# Patient Record
Sex: FEMALE | Race: WHITE | Employment: FULL TIME | URBAN - METROPOLITAN AREA
[De-identification: names, ages, dates, MRNs, and addresses within clinical notes are randomized per-mention and may not be internally consistent; named-entity substitution may affect disease eponyms.]

---

## 2017-08-22 ENCOUNTER — OFFICE VISIT (OUTPATIENT)
Dept: OBGYN CLINIC | Age: 52
End: 2017-08-22
Payer: COMMERCIAL

## 2017-08-22 ENCOUNTER — HOSPITAL ENCOUNTER (OUTPATIENT)
Age: 52
Setting detail: SPECIMEN
Discharge: HOME | End: 2017-08-22
Payer: COMMERCIAL

## 2017-08-22 VITALS
HEART RATE: 87 BPM | WEIGHT: 231 LBS | SYSTOLIC BLOOD PRESSURE: 129 MMHG | HEIGHT: 70 IN | BODY MASS INDEX: 33.07 KG/M2 | DIASTOLIC BLOOD PRESSURE: 86 MMHG | RESPIRATION RATE: 18 BRPM

## 2017-08-22 DIAGNOSIS — N92.6 IRREGULAR BLEEDING: ICD-10-CM

## 2017-08-22 DIAGNOSIS — Z12.4 CERVICAL CANCER SCREENING: ICD-10-CM

## 2017-08-22 DIAGNOSIS — Z01.419 WELL WOMAN EXAM: Primary | ICD-10-CM

## 2017-08-22 DIAGNOSIS — Z12.39 BREAST CANCER SCREENING: ICD-10-CM

## 2017-08-22 LAB
ESTRADIOL LEVEL: 112 PG/ML
FOLLICLE STIMULATING HORMONE: 10 U/L (ref 1.7–21.5)
LH: 6.6 U/L (ref 1–95.6)

## 2017-08-22 PROCEDURE — 99396 PREV VISIT EST AGE 40-64: CPT | Performed by: SPECIALIST

## 2017-08-22 RX ORDER — CLOTRIMAZOLE AND BETAMETHASONE DIPROPIONATE 10; .64 MG/G; MG/G
CREAM TOPICAL
Qty: 1 TUBE | Refills: 2 | Status: SHIPPED | OUTPATIENT
Start: 2017-08-22 | End: 2020-03-06

## 2017-08-22 ASSESSMENT — ENCOUNTER SYMPTOMS
EYE PAIN: 0
VOMITING: 0
DIARRHEA: 0
COUGH: 0
ABDOMINAL PAIN: 0
CONSTIPATION: 0
ABDOMINAL DISTENTION: 0
APNEA: 0
NAUSEA: 0

## 2017-08-28 LAB — CYTOLOGY REPORT: NORMAL

## 2017-08-29 ENCOUNTER — TELEPHONE (OUTPATIENT)
Dept: OBGYN CLINIC | Age: 52
End: 2017-08-29

## 2018-12-07 ENCOUNTER — TELEPHONE (OUTPATIENT)
Dept: OBGYN CLINIC | Age: 53
End: 2018-12-07

## 2018-12-21 ENCOUNTER — OFFICE VISIT (OUTPATIENT)
Dept: OBGYN CLINIC | Age: 53
End: 2018-12-21
Payer: COMMERCIAL

## 2018-12-21 ENCOUNTER — HOSPITAL ENCOUNTER (OUTPATIENT)
Age: 53
Setting detail: SPECIMEN
Discharge: HOME | End: 2018-12-21
Payer: COMMERCIAL

## 2018-12-21 VITALS
SYSTOLIC BLOOD PRESSURE: 135 MMHG | HEIGHT: 71 IN | RESPIRATION RATE: 18 BRPM | BODY MASS INDEX: 33.04 KG/M2 | DIASTOLIC BLOOD PRESSURE: 81 MMHG | HEART RATE: 77 BPM | WEIGHT: 236 LBS | TEMPERATURE: 99.1 F

## 2018-12-21 DIAGNOSIS — L72.0 INCLUSION CYST: ICD-10-CM

## 2018-12-21 DIAGNOSIS — Z11.51 SPECIAL SCREENING EXAMINATION FOR HUMAN PAPILLOMAVIRUS (HPV): ICD-10-CM

## 2018-12-21 DIAGNOSIS — Z12.39 SCREENING FOR BREAST CANCER: ICD-10-CM

## 2018-12-21 DIAGNOSIS — N76.0 ACUTE VAGINITIS: ICD-10-CM

## 2018-12-21 DIAGNOSIS — Z01.411 ENCOUNTER FOR GYNECOLOGICAL EXAMINATION WITH ABNORMAL FINDING: Primary | ICD-10-CM

## 2018-12-21 PROCEDURE — 99396 PREV VISIT EST AGE 40-64: CPT | Performed by: SPECIALIST

## 2018-12-21 RX ORDER — CLINDAMYCIN HYDROCHLORIDE 300 MG/1
300 CAPSULE ORAL 3 TIMES DAILY
Qty: 30 CAPSULE | Refills: 0 | Status: SHIPPED | OUTPATIENT
Start: 2018-12-21 | End: 2018-12-21 | Stop reason: CLARIF

## 2018-12-21 RX ORDER — FLUCONAZOLE 100 MG/1
100 TABLET ORAL DAILY
Qty: 7 TABLET | Refills: 0 | Status: SHIPPED | OUTPATIENT
Start: 2018-12-21 | End: 2018-12-28

## 2018-12-21 ASSESSMENT — ENCOUNTER SYMPTOMS
CONSTIPATION: 0
NAUSEA: 0
EYE PAIN: 0
COUGH: 0
VOMITING: 0
ABDOMINAL DISTENTION: 0
APNEA: 0
DIARRHEA: 0
ABDOMINAL PAIN: 0

## 2018-12-21 NOTE — PROGRESS NOTES
Subjective:      Patient ID: Danica Shelby is a 48 y.o. female. Chief Complaint   Patient presents with   Jessica Macias [facility] Doctor    Annual Exam     The patient is here for her annual examination today. /81 (Site: Right Upper Arm, Position: Sitting, Cuff Size: Large Adult)   Pulse 77   Temp 99.1 °F (37.3 °C) (Oral)   Resp 18   Ht 5' 11\" (1.803 m)   Wt 236 lb (107 kg)   LMP 2018 (Exact Date)   Breastfeeding? No   BMI 32.92 kg/m²   Patient's last menstrual period was 2018 (exact date).     Past Medical History:   Diagnosis Date    Allergic     ASCUS of cervix with negative high risk HPV 2016     Current Outpatient Prescriptions Ordered in [facility]   Medication Sig Dispense Refill    Cetirizine HCl 10 MG CAPS cetirizine 10 mg tablet   Take 1 tablet every day by oral route.  fluconazole (DIFLUCAN) 100 MG tablet Take 1 tablet by mouth daily for 7 days 7 tablet 0    Secnidazole (SOLOSEC) 2 g PACK Take 1 packet by mouth once for 1 dose Take one packet with yogurt, pudding, or applesauce. DO NOT CHEW. 1 each 0    norgestrel-ethinyl estradiol (LOW-OGESTREL) 0.3-30 MG-MCG per tablet Take 1 tablet by mouth daily 3 packet 0    clotrimazole-betamethasone (LOTRISONE) 1-0.05 % cream Apply externally to affected area twice daily for 7 days. 1 Tube 2     No current Epic-ordered facility-administered medications on file.       Problem List Items Addressed This Visit     None      Visit Diagnoses     Encounter for gynecological examination with abnormal finding    -  Primary    Relevant Orders    PAP SMEAR    Acute vaginitis        Special screening examination for human papillomavirus (HPV)        Relevant Orders    PAP SMEAR    Screening for breast cancer        Relevant Orders    ANN MARIE DIGITAL SCREEN W CAD BILATERAL    Inclusion cyst            Allergies   Allergen Reactions    Penicillins     Pollen Extract      Orders Placed This Encounter   Procedures    [facility] DIGITAL inguinal adenopathy present. Neurological: She is alert and oriented to person, place, and time. Skin: Skin is warm and dry. Patient with non-infected inclusion cyst of right inner, upper thigh, which was removed without difficulty by squeezing. Psychiatric: She has a normal mood and affect. Judgment normal.   Nursing note and vitals reviewed. Assessment:      Patient with vaginitis, otherwise normal annual exam.  Pap smear was done. Will treat with Solosec and Diflucan. Patient was advised to have mammogram done. Patient advised that if inclusion cyst returns, squeeze it to remove it. She was also made aware that at times, these become infected and pain. She should call the office if this occurs. Plan:      Orders Placed This Encounter   Procedures    ANN MARIE DIGITAL SCREEN W CAD BILATERAL    PAP SMEAR     Orders Placed This Encounter   Medications    fluconazole (DIFLUCAN) 100 MG tablet     Sig: Take 1 tablet by mouth daily for 7 days     Dispense:  7 tablet     Refill:  0    DISCONTD: clindamycin (CLEOCIN) 300 MG capsule     Sig: Take 1 capsule by mouth 3 times daily for 10 days     Dispense:  30 capsule     Refill:  0    Secnidazole (SOLOSEC) 2 g PACK     Sig: Take 1 packet by mouth once for 1 dose Take one packet with yogurt, pudding, or applesauce. DO NOT CHEW. Dispense:  1 each     Refill:  0     Appointment in 1 year    IEarline, am scribing for, and in the presence of Dr. Carlyle Lundborg. Electronically signed by: Earline Cheek 12/21/18 11:33 AM     I agree to the above documentation placed by my scribe Earline Cheek. I reviewed the scribe's note and agree with the documented findings and plan of care. Any areas of disagreement are noted on the chart. I have personally evaluated this patient. Additional findings are as noted.  I agree with the chief complaint, past medical history, past surgical history, allergies, medications, social and family history as documented unless otherwise noted below.      Electronically signed by Aftab Navarro MD on 12/24/2018 at 8:50 PM

## 2018-12-28 LAB
HPV SAMPLE: NORMAL
HPV SOURCE: NORMAL
HPV, GENOTYPE 16: NOT DETECTED
HPV, GENOTYPE 18: NOT DETECTED
HPV, HIGH RISK OTHER: NOT DETECTED
HPV, INTERPRETATION: NORMAL

## 2019-01-10 LAB — CYTOLOGY REPORT: NORMAL

## 2019-02-16 ENCOUNTER — HOSPITAL ENCOUNTER (OUTPATIENT)
Dept: MAMMOGRAPHY | Age: 54
Discharge: HOME | End: 2019-02-18
Payer: COMMERCIAL

## 2019-02-16 DIAGNOSIS — Z12.39 SCREENING FOR BREAST CANCER: ICD-10-CM

## 2019-02-16 PROCEDURE — 77063 BREAST TOMOSYNTHESIS BI: CPT

## 2019-12-03 RX ORDER — NORGESTREL AND ETHINYL ESTRADIOL 0.3-0.03MG
KIT ORAL
Qty: 84 TABLET | Refills: 0 | Status: SHIPPED | OUTPATIENT
Start: 2019-12-03 | End: 2020-02-26 | Stop reason: SDUPTHER

## 2020-02-24 RX ORDER — NORGESTREL AND ETHINYL ESTRADIOL 0.3-0.03MG
KIT ORAL
Qty: 84 TABLET | Refills: 0 | OUTPATIENT
Start: 2020-02-24

## 2020-03-06 ENCOUNTER — OFFICE VISIT (OUTPATIENT)
Dept: OBGYN CLINIC | Age: 55
End: 2020-03-06
Payer: COMMERCIAL

## 2020-03-06 ENCOUNTER — HOSPITAL ENCOUNTER (OUTPATIENT)
Age: 55
Setting detail: SPECIMEN
Discharge: HOME | End: 2020-03-06
Payer: COMMERCIAL

## 2020-03-06 VITALS
SYSTOLIC BLOOD PRESSURE: 140 MMHG | HEIGHT: 71 IN | HEART RATE: 80 BPM | DIASTOLIC BLOOD PRESSURE: 90 MMHG | WEIGHT: 253 LBS | RESPIRATION RATE: 16 BRPM | BODY MASS INDEX: 35.42 KG/M2

## 2020-03-06 PROBLEM — R03.0 ELEVATED BLOOD PRESSURE READING: Status: ACTIVE | Noted: 2020-03-06

## 2020-03-06 PROBLEM — Z01.419 WELL WOMAN EXAM: Status: ACTIVE | Noted: 2020-03-06

## 2020-03-06 PROBLEM — Z12.31 OTHER SCREENING MAMMOGRAM: Status: ACTIVE | Noted: 2020-03-06

## 2020-03-06 PROCEDURE — 99396 PREV VISIT EST AGE 40-64: CPT | Performed by: SPECIALIST

## 2020-03-06 RX ORDER — SIMVASTATIN 20 MG
TABLET ORAL
COMMUNITY
Start: 2020-02-16 | End: 2021-04-15

## 2020-03-06 RX ORDER — ALBUTEROL SULFATE 90 UG/1
AEROSOL, METERED RESPIRATORY (INHALATION)
COMMUNITY

## 2020-03-06 RX ORDER — MELOXICAM 15 MG/1
TABLET ORAL
COMMUNITY
Start: 2020-01-31 | End: 2022-05-20 | Stop reason: HOSPADM

## 2020-03-06 ASSESSMENT — ENCOUNTER SYMPTOMS
NAUSEA: 0
EYE PAIN: 0
CONSTIPATION: 0
ABDOMINAL DISTENTION: 0
DIARRHEA: 0
ABDOMINAL PAIN: 0
COUGH: 0
VOMITING: 0
APNEA: 0

## 2020-03-06 NOTE — PROGRESS NOTES
Subjective:      Patient ID: Sonia Lenz is a 48 y.o. female. Chief Complaint   Patient presents with    Gynecologic Exam     patient is here today for her annual pap and breast exam.     BP (!) 140/90 (Site: Right Upper Arm, Position: Sitting, Cuff Size: Large Adult)   Pulse 80   Resp 16   Ht 5' 11\" (1.803 m)   Wt 253 lb (114.8 kg)   LMP 2020 (Exact Date)   BMI 35.29 kg/m²   Patient's last menstrual period was 2020 (exact date).     Past Medical History:   Diagnosis Date    Allergic     ASCUS of cervix with negative high risk HPV 2016     Current Outpatient Medications Ordered in Epic   Medication Sig Dispense Refill    simvastatin (ZOCOR) 20 MG tablet TK 1 T PO QD      meloxicam (MOBIC) 15 MG tablet TK 1 T PO QD      albuterol sulfate HFA (PROAIR HFA) 108 (90 Base) MCG/ACT inhaler ProAir HFA 90 mcg/actuation aerosol inhaler      norgestrel-ethinyl estradiol (LOW-OGESTREL) 0.3-30 MG-MCG per tablet TAKE 1 TABLET BY MOUTH DAILY 30 tablet 0    Cetirizine HCl 10 MG CAPS cetirizine 10 mg tablet   Take 1 tablet every day by oral route. No current [facility]-ordered facility-administered medications on file. Problem List Items Addressed This Visit     Well woman exam - Primary    Relevant Orders    PAP SMEAR    Other screening mammogram    Relevant Orders    PAP SMEAR    ANN MARIE DIGITAL SCREEN W CAD BILATERAL    Elevated blood pressure reading    Relevant Orders    PAP SMEAR        Allergies   Allergen Reactions    Grass Extracts  [Gramineae Pollens]     Penicillins     Pollen Extract      Orders Placed This Encounter   Procedures    ANN MARIE DIGITAL SCREEN W CAD BILATERAL     Standing Status:   Future     Standing Expiration Date:   2020     Order Specific Question:   Reason for exam:     Answer:   yearly breast cancer screening    PAP SMEAR     Patient History:    Patient's last menstrual period was 2020 (exact date).   OBGYN Status: Having periods  Past year.    Myron Moran am scribing for, and in the presence of Dr. Meggan Suggs. Electronically signed by: Bridgtete Madrid 3/6/20 9:26 AM       I agree to the above documentation placed by my scribe Bridgette Madrid. I reviewed the scribe's note and agree with the documented findings and plan of care. Any areas of disagreement are noted on the chart. I have personally evaluated this patient. Additional findings are as noted. I agree with the chief complaint, past medical history, past surgical history, allergies, medications, social and family history as documented unless otherwise noted below.      Electronically signed by Meggan Suggs MD on 3/8/2020 at 3:56 AM

## 2020-03-10 LAB
HPV SAMPLE: NORMAL
HPV, GENOTYPE 16: NOT DETECTED
HPV, GENOTYPE 18: NOT DETECTED
HPV, HIGH RISK OTHER: NOT DETECTED
HPV, INTERPRETATION: NORMAL
SPECIMEN DESCRIPTION: NORMAL

## 2020-03-16 LAB — CYTOLOGY REPORT: NORMAL

## 2020-04-05 PROBLEM — Z01.419 WELL WOMAN EXAM: Status: RESOLVED | Noted: 2020-03-06 | Resolved: 2020-04-05

## 2020-05-18 RX ORDER — NORGESTREL AND ETHINYL ESTRADIOL 0.3-0.03MG
KIT ORAL
Qty: 28 TABLET | Refills: 1 | Status: SHIPPED | OUTPATIENT
Start: 2020-05-18 | End: 2020-06-16 | Stop reason: SDUPTHER

## 2020-06-13 ENCOUNTER — HOSPITAL ENCOUNTER (OUTPATIENT)
Dept: WOMENS IMAGING | Age: 55
Discharge: HOME | End: 2020-06-15
Payer: COMMERCIAL

## 2020-06-13 PROCEDURE — 77063 BREAST TOMOSYNTHESIS BI: CPT

## 2020-06-15 ENCOUNTER — TELEPHONE (OUTPATIENT)
Dept: OBGYN CLINIC | Age: 55
End: 2020-06-15

## 2020-06-16 RX ORDER — NORGESTREL AND ETHINYL ESTRADIOL 0.3-0.03MG
KIT ORAL
Qty: 84 TABLET | Refills: 1 | Status: SHIPPED | OUTPATIENT
Start: 2020-06-16 | End: 2020-07-15

## 2020-07-15 RX ORDER — NORGESTREL AND ETHINYL ESTRADIOL 0.3-0.03MG
KIT ORAL
Qty: 28 TABLET | Refills: 1 | Status: SHIPPED | OUTPATIENT
Start: 2020-07-15 | End: 2020-11-23

## 2020-11-25 ENCOUNTER — NURSE ONLY (OUTPATIENT)
Dept: OBGYN CLINIC | Age: 55
End: 2020-11-25

## 2020-11-25 VITALS — SYSTOLIC BLOOD PRESSURE: 122 MMHG | DIASTOLIC BLOOD PRESSURE: 68 MMHG

## 2021-02-23 ENCOUNTER — TELEPHONE (OUTPATIENT)
Dept: OBGYN CLINIC | Age: 56
End: 2021-02-23

## 2021-03-11 ENCOUNTER — TELEPHONE (OUTPATIENT)
Dept: OBGYN CLINIC | Age: 56
End: 2021-03-11

## 2021-03-11 NOTE — TELEPHONE ENCOUNTER
Called lm for pt to let her know that she does not need appt tomorrow to see JADA Mark for MRI orders these orders will be placed and be scheduled at her convenience.

## 2021-03-12 DIAGNOSIS — Z12.39 ENCOUNTER FOR SCREENING FOR MALIGNANT NEOPLASM OF BREAST, UNSPECIFIED SCREENING MODALITY: Primary | ICD-10-CM

## 2021-04-15 ENCOUNTER — HOSPITAL ENCOUNTER (OUTPATIENT)
Age: 56
Setting detail: SPECIMEN
Discharge: HOME | End: 2021-04-15
Payer: COMMERCIAL

## 2021-04-15 ENCOUNTER — OFFICE VISIT (OUTPATIENT)
Dept: OBGYN CLINIC | Age: 56
End: 2021-04-15
Payer: COMMERCIAL

## 2021-04-15 VITALS
BODY MASS INDEX: 37.8 KG/M2 | HEART RATE: 84 BPM | SYSTOLIC BLOOD PRESSURE: 134 MMHG | HEIGHT: 70 IN | DIASTOLIC BLOOD PRESSURE: 90 MMHG | WEIGHT: 264 LBS

## 2021-04-15 DIAGNOSIS — Z80.3 FAMILY HISTORY OF BREAST CANCER IN MOTHER: ICD-10-CM

## 2021-04-15 DIAGNOSIS — Z12.31 ENCOUNTER FOR SCREENING MAMMOGRAM FOR BREAST CANCER: ICD-10-CM

## 2021-04-15 DIAGNOSIS — Z01.419 PAP SMEAR, AS PART OF ROUTINE GYNECOLOGICAL EXAMINATION: Primary | ICD-10-CM

## 2021-04-15 DIAGNOSIS — Z11.51 SCREENING FOR HUMAN PAPILLOMAVIRUS: ICD-10-CM

## 2021-04-15 PROCEDURE — 99396 PREV VISIT EST AGE 40-64: CPT | Performed by: SPECIALIST

## 2021-04-15 RX ORDER — SIMVASTATIN 40 MG
40 TABLET ORAL NIGHTLY
COMMUNITY

## 2021-04-15 ASSESSMENT — ENCOUNTER SYMPTOMS
VOMITING: 0
DIARRHEA: 0
APNEA: 0
ABDOMINAL DISTENTION: 0
COUGH: 0
CONSTIPATION: 0
EYE PAIN: 0
NAUSEA: 0
ABDOMINAL PAIN: 0

## 2021-04-15 NOTE — PROGRESS NOTES
Subjective:      Patient ID: Balwinder Jara is a 50 y.o. female. Chief Complaint   Patient presents with    Annual Exam     BP (!) 134/90 (Site: Right Lower Arm, Position: Sitting, Cuff Size: Medium Adult)   Pulse 84   Ht 5' 10\" (1.778 m)   Wt 264 lb (119.7 kg)   LMP 2021 (Approximate)   Breastfeeding No   BMI 37.88 kg/m²   Patient's last menstrual period was 2021 (approximate).     Past Medical History:   Diagnosis Date    Allergic     ASCUS of cervix with negative high risk HPV 2016    Fibromyalgia      Current Outpatient Medications Ordered in Epic   Medication Sig Dispense Refill    simvastatin (ZOCOR) 40 MG tablet Take 40 mg by mouth nightly      LOW-OGESTREL 0.3-30 MG-MCG per tablet TAKE 1 TABLET DAILY PER PACKAGE INSERT 84 tablet 0    meloxicam (MOBIC) 15 MG tablet TK 1 T PO QD      albuterol sulfate HFA (PROAIR HFA) 108 (90 Base) MCG/ACT inhaler ProAir HFA 90 mcg/actuation aerosol inhaler      Cetirizine HCl 10 MG CAPS cetirizine 10 mg tablet   Take 1 tablet every day by oral route. No current [facility]-ordered facility-administered medications on file. Problem List Items Addressed This Visit     Pap smear, as part of routine gynecological examination - Primary    Relevant Orders    PAP SMEAR    Encounter for screening mammogram for breast cancer    Relevant Orders    ANN MARIE DIGITAL SCREEN W OR WO CAD BILATERAL    Family history of breast cancer in mother    Relevant Orders    [facility]        Allergies   Allergen Reactions    Grass Extracts  [Gramineae Pollens]     Penicillins     Pollen Extract      Orders Placed This Encounter   Procedures    ANN MARIE DIGITAL SCREEN W OR WO CAD BILATERAL     Standing Status:   Future     Standing Expiration Date:   2022     Order Specific Question:   Reason for exam:     Answer:   screen breast cancer    PAP SMEAR     Patient History:    No LMP recorded.   OBGYN Status: Having periods  Past Surgical History:  No date: APPENDECTOMY  No date: KNEE ARTHROSCOPY; Left  Medications/Contraceptives Affecting Cytology     Combination Contraceptives - Oral Disp Start End     LOW-OGESTREL 0.3-30 MG-MCG per tablet    84 tablet 2/15/2021     Sig: TAKE 1 TABLET DAILY PER PACKAGE INSERT       Social History    Tobacco Use      Smoking status: Never Smoker      Smokeless tobacco: Never Used       Standing Status:   Future     Standing Expiration Date:   4/15/2022     Order Specific Question:   Collection Type     Answer: Thin Prep     Order Specific Question:   Prior Abnormal Pap Test     Answer:   No     Order Specific Question:   Screening or Diagnostic     Answer:   Screening     Order Specific Question:   HPV Requested? Answer:   Yes     Order Specific Question:   High Risk Patient     Answer:   N/A    [facility]     Referral Priority:   Routine     Referral Type:   Eval and Treat     Referral Reason:   Specialty Services Required     Requested Specialty:   Oncology     Number of Visits Requested:   1        Patient is here today for her annual exam.  Patient needs a referral to a high risk breast clinic because her mother and grandmother had breast breast cancer. Patient is having periods and wants to know when she can expect menopause. She does wake up in the middle of the night fully drenched. She is still taking birth control pills. Review of Systems   Constitutional: Negative for activity change, appetite change and fever. HENT: Negative for ear discharge and ear pain. Eyes: Negative for pain and visual disturbance. Respiratory: Negative for apnea and cough. Cardiovascular: Negative for chest pain, palpitations and leg swelling. Gastrointestinal: Negative for abdominal distention, abdominal pain, constipation, diarrhea, nausea and vomiting. Endocrine: Negative. Genitourinary: Negative for difficulty urinating, dysuria, menstrual problem and pelvic pain. Musculoskeletal: Negative for neck pain and neck stiffness. Skin: Negative. Neurological: Negative for light-headedness and numbness. Hematological: Negative. Does not bruise/bleed easily. Objective:   Physical Exam  Vitals signs and nursing note reviewed. Exam conducted with a chaperone present. Constitutional:       Appearance: She is well-developed. HENT:      Head: Normocephalic and atraumatic. Neck:      Thyroid: No thyroid mass or thyromegaly. Cardiovascular:      Rate and Rhythm: Normal rate and regular rhythm. Pulmonary:      Effort: Pulmonary effort is normal.      Breath sounds: Normal breath sounds. Chest:      Breasts:         Right: Normal. No inverted nipple, mass, nipple discharge, skin change or tenderness. Left: Normal. No inverted nipple, mass, nipple discharge, skin change or tenderness. Abdominal:      General: Bowel sounds are normal. There is no distension. Palpations: Abdomen is soft. There is no mass. Tenderness: There is no abdominal tenderness. There is no guarding or rebound. Hernia: There is no hernia in the left inguinal area. Genitourinary:     General: Normal vulva. Exam position: Supine. Labia:         Right: No rash or lesion. Left: No rash or lesion. Vagina: Normal. No signs of injury. No vaginal discharge or tenderness. Cervix: No cervical motion tenderness or discharge. Uterus: Normal. Not enlarged, not fixed and not tender. Adnexa: Right adnexa normal and left adnexa normal.        Right: No mass or tenderness. Left: No mass or tenderness. Rectum: Normal. No mass or anal fissure. Normal anal tone. Musculoskeletal: Normal range of motion. General: No tenderness. Skin:     General: Skin is warm and dry. Neurological:      Mental Status: She is alert and oriented to person, place, and time.    Psychiatric:         Judgment: Judgment normal.         Assessment:

## 2021-04-20 LAB
CYTOLOGY REPORT: NORMAL
HPV SAMPLE: NORMAL
HPV, GENOTYPE 16: NOT DETECTED
HPV, GENOTYPE 18: NOT DETECTED
HPV, HIGH RISK OTHER: NOT DETECTED
HPV, INTERPRETATION: NORMAL
SPECIMEN DESCRIPTION: NORMAL

## 2021-05-10 RX ORDER — NORGESTREL AND ETHINYL ESTRADIOL 0.3-0.03MG
KIT ORAL
Qty: 84 TABLET | Refills: 3 | Status: SHIPPED | OUTPATIENT
Start: 2021-05-10 | End: 2022-04-11

## 2021-05-10 NOTE — TELEPHONE ENCOUNTER
Last visit: 04-15-21  Last Med refill: 04-15-21  Does patient have enough medication for 72 hours: NO    Next Visit Date:  No future appointments.     Health Maintenance   Topic Date Due    Hepatitis C screen  Never done    Lipid screen  Never done    HIV screen  Never done    COVID-19 Vaccine (1) Never done    DTaP/Tdap/Td vaccine (1 - Tdap) Never done    Diabetes screen  Never done    Shingles Vaccine (1 of 2) Never done    Colon cancer screen colonoscopy  Never done    Breast cancer screen  06/13/2021    Flu vaccine (Season Ended) 09/01/2021    Cervical cancer screen  04/15/2026    Hepatitis A vaccine  Aged Out    Hepatitis B vaccine  Aged Out    Hib vaccine  Aged Out    Meningococcal (ACWY) vaccine  Aged Out    Pneumococcal 0-64 years Vaccine  Aged Out       No results found for: LABA1C          ( goal A1C is < 7)   No results found for: LABMICR  No results found for: LDLCHOLESTEROL, LDLCALC    (goal LDL is <100)   No results found for: AST, ALT, BUN  BP Readings from Last 3 Encounters:   04/15/21 (!) 134/90   11/25/20 122/68   03/06/20 (!) 140/90          (goal 120/80)    All Future Testing planned in [facility]  Lab Frequency Next Occurrence   MRI BREAST BILATERAL WO CONTRAST Once 04/29/2021   ANN MARIE DIGITAL SCREEN W OR WO CAD BILATERAL Once 07/01/2021               Patient Active Problem List:     ASCUS of cervix with negative high risk HPV     Pap smear, as part of routine gynecological examination     Other screening mammogram     Elevated blood pressure reading     Encounter for screening mammogram for breast cancer     Family history of breast cancer in mother

## 2021-05-15 PROBLEM — Z01.419 PAP SMEAR, AS PART OF ROUTINE GYNECOLOGICAL EXAMINATION: Status: RESOLVED | Noted: 2020-03-06 | Resolved: 2021-05-15

## 2021-05-15 PROBLEM — Z12.31 ENCOUNTER FOR SCREENING MAMMOGRAM FOR BREAST CANCER: Status: RESOLVED | Noted: 2021-04-15 | Resolved: 2021-05-15

## 2021-09-14 ENCOUNTER — TELEPHONE (OUTPATIENT)
Dept: ONCOLOGY | Age: 56
End: 2021-09-14

## 2021-09-14 ENCOUNTER — INITIAL CONSULT (OUTPATIENT)
Dept: ONCOLOGY | Age: 56
End: 2021-09-14
Payer: COMMERCIAL

## 2021-09-14 VITALS
TEMPERATURE: 96.8 F | DIASTOLIC BLOOD PRESSURE: 92 MMHG | WEIGHT: 250 LBS | HEIGHT: 71 IN | HEART RATE: 78 BPM | SYSTOLIC BLOOD PRESSURE: 144 MMHG | BODY MASS INDEX: 35 KG/M2

## 2021-09-14 DIAGNOSIS — Z80.3 FAMILY HISTORY OF BREAST CANCER IN MOTHER: Primary | ICD-10-CM

## 2021-09-14 DIAGNOSIS — Z91.89 AT HIGH RISK FOR BREAST CANCER: ICD-10-CM

## 2021-09-14 PROCEDURE — 99202 OFFICE O/P NEW SF 15 MIN: CPT | Performed by: INTERNAL MEDICINE

## 2021-09-14 PROCEDURE — 99204 OFFICE O/P NEW MOD 45 MIN: CPT | Performed by: INTERNAL MEDICINE

## 2021-09-14 PROCEDURE — 96040 PR GENETIC COUNSELING, EACH 30 MIN: CPT | Performed by: GENETIC COUNSELOR, MS

## 2021-09-14 RX ORDER — PREGABALIN 75 MG/1
CAPSULE ORAL
COMMUNITY

## 2021-09-14 NOTE — PROGRESS NOTES
3 [facility]   Hereditary Cancer Risk Assessment     Name: Ash Ramos   YOB: 1965   Date of Consultation: 9/14/21     Ms. Andria Ahumada was seen at the [place] [address] for genetic counseling on 9/14/21. She is also seen by medical oncologist Dr. Eileen Canavan. Ms. Andria Ahumada was referred by Apurva Hill MD to discuss her elevated lifetime risk for breast cancer which was calculated at her most recent mammogram.     PERSONAL HISTORY   Ms. Andria Ahumada is a 50 y.o.  female with no personal history of cancer. She reports menarche at age 17, first child at age 23, and is shelton menopausal. Ms. Andria Ahumada has never had a hysterectomy and both ovaries are intact. Ms. Andria Ahumada reports annual mammograms. She has never had a breast MRI. She did have a cyst removed from her right breast many years ago. At her mammogram on 6/13/21, a lifetime risk for breast cancer was calculated. According to the [employer] risk model, Ms. Weber's lifetime risk for breast cancer is approximately 21.4%. Additional history was collected during today's consultation, which did adjust Ms. Weber's lifetime risk for breast cancer to 24.6%. FAMILY HISTORY  Ms. Andria Ahumada has one son (32y) and one daughter (32y). She has one full sister (46y) and one full brother (52y), no cancer. Ms. Brent Crum mother passed away at age 68 and had a history of breast cancer diagnosed at age 43. She was later diagnosed with metastatic cancer, it is unclear if it was metastatic breast cancer or a new primary lung cancer. Ms. Brent Crum maternal grandmother also had breast cancer at age 61. Ms. Brent Crum father passed away at age 76 and had a history of bladder cancer. Her father was adopted; thus, there is no information known regarding her extended paternal family.       Ms. Andria Ahumada reports [place] ancestry cancers. We discussed the possibility of finding a mutation in genes with limited information to guide medical management, as well we as the possibility of identifying variants of uncertain significance (VUS). We discussed the risks, benefits, and limitations of genetic testing. Possible test results were discussed as well as potential screening and prevention strategies. Specifically, we discussed increased breast cancer surveillance by mammogram and breast MRI as well as the option of prophylactic mastectomy. We discussed the recommendation for prophylactic oophorectomy for results which suggest an increased risk for ovarian cancer. Lastly, we discussed that the results of Ms. Weber's genetic testing may be beneficial in defining her risk for cancer as well as for her family members. SUMMARY & PLAN  1) Ms. Gopi Beverly meets the NCCN criteria for genetic testing based on her maternal family history of early onset breast cancer. 2) Genetic testing via a multi-gene panel was recommended and offered to Ms. Weber. She declined to proceed with testing today; she would like additional time to consider her options. An insurance verification will be requested to determine Ms. Weber's potential out of pocket cost of testing in the meantime. 3) Ms. Lorene Agustin lifetime risk for breast cancer is approximately 24.6%. She may consider an annual breast MRI staggered 6 months apart from her annual mammogram according to the NCCN. She is due for her annual mammogram and plans to discuss MRI screening with Dr. Tracey Clarke when she follows up with him in April 2022. Ms. Gopi Beverly met with Dr. Celio Silva today to review breast cancer screening recommendations. A total of 40 minutes were spent face to face with Ms. Gopi Beverly and 50% of the time was spent educating and counseling.      The [address] [place], [place] [address] would be glad to offer our assistance should you have any questions or concerns about this information. Please feel free to contact us at [phone]. Sohail Sotomayor.  Willy Mckeon MS, [facility]   Licensed Genetic Counselor

## 2021-09-15 NOTE — PROGRESS NOTES
BRIEF CASE HISTORY: the patient  is a very pleasant 50 y.o.  female who is referred to us for consultation for the high risk  breast cancer clinic. She is evaluated by myself and the genetic counselor. She does not have any personal history of breast cancer but she has significant family history. She underwent screening mammogram and during the screen, she took the [facility]. The questionnaire showed that her lifetime risk of breast cancer is 21.4  which falls into the high risk category. She is referred to us for counseling, discussion of risk reduction modalities as well as genetic evaluation. GYN history   Menarche: 17  Menopause age:     perimenopausal        PAST MEDICAL HISTORY:  Past Medical History:   Diagnosis Date    Allergic     ASCUS of cervix with negative high risk HPV 9/8/2016    Fibromyalgia 2021            PAST SURGICAL HISTORY: has a past surgical history that includes Appendectomy and Knee arthroscopy (Left). CURRENT MEDICATIONS:  has a current medication list which includes the following prescription(s): pregabalin, low-ogestrel, simvastatin, meloxicam, albuterol sulfate hfa, and cetirizine hcl. ALLERGIES:    Allergies   Allergen Reactions    Grass Extracts  [Gramineae Pollens]     Penicillins     Pollen Extract           FAMILY HISTORY: detailed family history was obtained, and reviewed, it is detailed in the genetic counselor note. SOCIAL HISTORY:  reports that she has never smoked. She has never used smokeless tobacco. She reports that she does not drink alcohol and does not use drugs. REVIEW OF SYSTEMS:   General: No fever or night sweats. Weight is stable. ENT: No double or blurred vision, no tinnitus or hearing problem, no dysphagia or sore throat   Respiratory: No chest pain, no shortness of breath, no cough or hemoptysis. Cardiovascular: Denies chest pain, PND or orthopnea.  No L E swelling or palpitations. Gastrointestinal: No nausea or vomiting, abdominal pain, diarrhea or constipation. Genitourinary: Denies dysuria, hematuria, frequency, urgency or incontinence. Neurological: Denies headaches, decreased LOC, no sensory or motor focal deficits. Musculoskeletal: No arthralgia no back pain or joint swelling. PHYSICAL EXAM: Shows a well appearing 50 y.o.  female who is not in pain or distress. Vital Signs:@  HEENT: Normocephalic and atraumatic. Pupils are equal, round, reactive to light and accommodation. Extraocular muscles are intact. Neck: Showed no JVD, no carotid bruit . Lungs: Clear to auscultation bilaterally. Heart: Regular without any murmur. Abdomen: Soft, nontender. No hepatosplenomegaly. Extremities: Lower extremities show no edema, clubbing, or cyanosis. Breasts: Examination not done today. (declined)  Neuro exam: intact cranial nerves bilaterally no motor or sensory deficit, gait is normal. Lymphatic: no adenopathy appreciated in the supraclavicular, axillary, cervical or inguinal area     REVIEW OF LABORATORY DATA:      REVIEW OF RADIOLOGICAL RESULTS:   Recent imaging studies were reviewed           IMPRESSION:   1. Elevated lifetime risk of breast cancer based on the [address], life time risk in 20.1    PLAN:   I had a very long discussion with the patient, explaining the Jalyn Dart model and the risk factor that led to hair falling into the high risk category. Lifetime risk of developing breast cancer in average [facility] woman ranges between 10% and 12%. So her lifetime risk is almost double the normal.   Various governing bodies in the oncology world have determined that lifetime risk of breast cancer above 20% constitutes a group of women who are eligible for enhanced surveillance and should be counseled about genetic breast cancer.   Also guidelines determined that this group of women are eligible for breast cancer reduction modalities such as the use of tamoxifen based on the NSABP study of tamoxifen and raloxifene and breast cancer (STAR trial) her tamoxifen was able to decrease the discomfort risk cancer by 50%  Side effects of tamoxifen were explained in detail including hot flashes, possibility of elevated liver enzymes, the rare possibilities of deep venous thrombosis and the even more rare possibility of vaginal bleeding, endometrial hyperplasia or even endometrial carcinoma. After thorough discussion and careful explanation of the above, the patient chose enhanced surveillance, she declined genetic testing and tamoxifen. She will think about genetic testing after discussing with her family. The patient needs alternating mammogram and MRI every 6 months. She will follow with her Gyn.   RTC as needed    Genetic counselor note:   Please refer to note from [place] for additional details. Thank you for asking us to see the patient, please feel free to contact us with any question or concern. Burak Trejo MD  Hematologist/Medical Oncologist      On this date 9/15/21  I have spent 60 minutes reviewing previous notes, test results and face to face with the patient discussing the diagnosis and importance of compliance with the treatment plan. Greater than 50% of that time was spent face-to-face with the patient in counseling and coordinating her care. This note is created with the assistance of a speech recognition program.  While intending to generate a document that actually reflects the content of the visit, the document can still have some errors including those of syntax and sound a like substitutions which may escape proof reading. It such instances, actual meaning can be extrapolated by contextual diversion.

## 2021-10-29 ENCOUNTER — HOSPITAL ENCOUNTER (OUTPATIENT)
Dept: WOMENS IMAGING | Age: 56
Discharge: HOME | End: 2021-10-31
Payer: COMMERCIAL

## 2021-10-29 DIAGNOSIS — Z12.31 ENCOUNTER FOR SCREENING MAMMOGRAM FOR BREAST CANCER: ICD-10-CM

## 2021-10-29 PROCEDURE — 77063 BREAST TOMOSYNTHESIS BI: CPT

## 2022-04-11 RX ORDER — NORGESTREL AND ETHINYL ESTRADIOL 0.3-0.03MG
KIT ORAL
Qty: 84 TABLET | Refills: 0 | Status: SHIPPED | OUTPATIENT
Start: 2022-04-11 | End: 2022-05-20 | Stop reason: HOSPADM

## 2022-05-20 ENCOUNTER — APPOINTMENT (OUTPATIENT)
Dept: CT IMAGING | Age: 57
End: 2022-05-20
Payer: COMMERCIAL

## 2022-05-20 ENCOUNTER — HOSPITAL ENCOUNTER (EMERGENCY)
Age: 57
Discharge: HOME | End: 2022-05-20
Attending: EMERGENCY MEDICINE
Payer: COMMERCIAL

## 2022-05-20 VITALS
TEMPERATURE: 98.4 F | HEIGHT: 70 IN | OXYGEN SATURATION: 97 % | HEART RATE: 86 BPM | SYSTOLIC BLOOD PRESSURE: 148 MMHG | DIASTOLIC BLOOD PRESSURE: 90 MMHG | BODY MASS INDEX: 37.94 KG/M2 | WEIGHT: 265 LBS | RESPIRATION RATE: 18 BRPM

## 2022-05-20 DIAGNOSIS — I26.94 MULTIPLE SUBSEGMENTAL PULMONARY EMBOLI WITHOUT ACUTE COR PULMONALE (HCC): Primary | ICD-10-CM

## 2022-05-20 LAB
ABSOLUTE EOS #: 0.2 K/UL (ref 0–0.4)
ABSOLUTE LYMPH #: 2.2 K/UL (ref 1–4.8)
ABSOLUTE MONO #: 0.6 K/UL (ref 0.1–1.2)
ALBUMIN SERPL-MCNC: 4.4 G/DL (ref 3.5–5.2)
ALBUMIN/GLOBULIN RATIO: 1.2 (ref 1–2.5)
ALP BLD-CCNC: 101 U/L (ref 35–104)
ALT SERPL-CCNC: 28 U/L (ref 5–33)
ANION GAP SERPL CALCULATED.3IONS-SCNC: 11 MMOL/L (ref 9–17)
AST SERPL-CCNC: 27 U/L
BASOPHILS # BLD: 1 % (ref 0–2)
BASOPHILS ABSOLUTE: 0.1 K/UL (ref 0–0.2)
BILIRUB SERPL-MCNC: 0.4 MG/DL (ref 0.3–1.2)
BUN BLDV-MCNC: 20 MG/DL (ref 6–20)
CALCIUM SERPL-MCNC: 11.2 MG/DL (ref 8.6–10.4)
CHLORIDE BLD-SCNC: 102 MMOL/L (ref 98–107)
CO2: 25 MMOL/L (ref 20–31)
CREAT SERPL-MCNC: 0.62 MG/DL (ref 0.5–0.9)
D-DIMER QUANTITATIVE: 3.45 MG/L FEU
EOSINOPHILS RELATIVE PERCENT: 3 % (ref 1–4)
GFR AFRICAN AMERICAN: >60 ML/MIN
GFR NON-AFRICAN AMERICAN: >60 ML/MIN
GFR SERPL CREATININE-BSD FRML MDRD: ABNORMAL ML/MIN/{1.73_M2}
GLUCOSE BLD-MCNC: 106 MG/DL (ref 70–99)
HCT VFR BLD CALC: 46.8 % (ref 36–46)
HEMOGLOBIN: 15.5 G/DL (ref 12–16)
LIPASE: 45 U/L (ref 13–60)
LYMPHOCYTES # BLD: 24 % (ref 24–44)
MCH RBC QN AUTO: 30.6 PG (ref 26–34)
MCHC RBC AUTO-ENTMCNC: 33.1 G/DL (ref 31–37)
MCV RBC AUTO: 92.5 FL (ref 80–100)
MONOCYTES # BLD: 6 % (ref 2–11)
PARTIAL THROMBOPLASTIN TIME: 23.4 SEC (ref 21.3–31.3)
PDW BLD-RTO: 14.6 % (ref 12.5–15.4)
PLATELET # BLD: 343 K/UL (ref 140–450)
PMV BLD AUTO: 8.2 FL (ref 6–12)
POTASSIUM SERPL-SCNC: 3.7 MMOL/L (ref 3.7–5.3)
RBC # BLD: 5.06 M/UL (ref 4–5.2)
SEG NEUTROPHILS: 66 % (ref 36–66)
SEGMENTED NEUTROPHILS ABSOLUTE COUNT: 6.2 K/UL (ref 1.8–7.7)
SODIUM BLD-SCNC: 138 MMOL/L (ref 135–144)
TOTAL PROTEIN: 8 G/DL (ref 6.4–8.3)
TROPONIN, HIGH SENSITIVITY: <6 NG/L (ref 0–14)
WBC # BLD: 9.2 K/UL (ref 3.5–11)

## 2022-05-20 PROCEDURE — 80053 COMPREHEN METABOLIC PANEL: CPT

## 2022-05-20 PROCEDURE — 71275 CT ANGIOGRAPHY CHEST: CPT

## 2022-05-20 PROCEDURE — 6370000000 HC RX 637 (ALT 250 FOR IP): Performed by: HOSPITALIST

## 2022-05-20 PROCEDURE — 6360000004 HC RX CONTRAST MEDICATION: Performed by: PHYSICIAN ASSISTANT

## 2022-05-20 PROCEDURE — 83690 ASSAY OF LIPASE: CPT

## 2022-05-20 PROCEDURE — 36415 COLL VENOUS BLD VENIPUNCTURE: CPT

## 2022-05-20 PROCEDURE — 93005 ELECTROCARDIOGRAM TRACING: CPT | Performed by: PHYSICIAN ASSISTANT

## 2022-05-20 PROCEDURE — 85025 COMPLETE CBC W/AUTO DIFF WBC: CPT

## 2022-05-20 PROCEDURE — 84484 ASSAY OF TROPONIN QUANT: CPT

## 2022-05-20 PROCEDURE — 99285 EMERGENCY DEPT VISIT HI MDM: CPT

## 2022-05-20 PROCEDURE — 96360 HYDRATION IV INFUSION INIT: CPT

## 2022-05-20 PROCEDURE — 2580000003 HC RX 258: Performed by: PHYSICIAN ASSISTANT

## 2022-05-20 PROCEDURE — 85379 FIBRIN DEGRADATION QUANT: CPT

## 2022-05-20 PROCEDURE — 85730 THROMBOPLASTIN TIME PARTIAL: CPT

## 2022-05-20 RX ORDER — SODIUM CHLORIDE 0.9 % (FLUSH) 0.9 %
10 SYRINGE (ML) INJECTION PRN
Status: DISCONTINUED | OUTPATIENT
Start: 2022-05-20 | End: 2022-05-20 | Stop reason: HOSPADM

## 2022-05-20 RX ORDER — TIZANIDINE 4 MG/1
TABLET ORAL
COMMUNITY
Start: 2022-04-04

## 2022-05-20 RX ORDER — HEPARIN SODIUM 10000 [USP'U]/100ML
5-30 INJECTION, SOLUTION INTRAVENOUS CONTINUOUS
Status: DISCONTINUED | OUTPATIENT
Start: 2022-05-20 | End: 2022-05-20

## 2022-05-20 RX ORDER — HEPARIN SODIUM 1000 [USP'U]/ML
80 INJECTION, SOLUTION INTRAVENOUS; SUBCUTANEOUS PRN
Status: DISCONTINUED | OUTPATIENT
Start: 2022-05-20 | End: 2022-05-20

## 2022-05-20 RX ORDER — HEPARIN SODIUM 1000 [USP'U]/ML
40 INJECTION, SOLUTION INTRAVENOUS; SUBCUTANEOUS PRN
Status: DISCONTINUED | OUTPATIENT
Start: 2022-05-20 | End: 2022-05-20

## 2022-05-20 RX ORDER — BACLOFEN 20 MG
TABLET ORAL
COMMUNITY
Start: 2022-04-04

## 2022-05-20 RX ORDER — 0.9 % SODIUM CHLORIDE 0.9 %
1000 INTRAVENOUS SOLUTION INTRAVENOUS ONCE
Status: COMPLETED | OUTPATIENT
Start: 2022-05-20 | End: 2022-05-20

## 2022-05-20 RX ORDER — 0.9 % SODIUM CHLORIDE 0.9 %
80 INTRAVENOUS SOLUTION INTRAVENOUS ONCE
Status: DISCONTINUED | OUTPATIENT
Start: 2022-05-20 | End: 2022-05-20 | Stop reason: HOSPADM

## 2022-05-20 RX ORDER — HEPARIN SODIUM 1000 [USP'U]/ML
80 INJECTION, SOLUTION INTRAVENOUS; SUBCUTANEOUS ONCE
Status: DISCONTINUED | OUTPATIENT
Start: 2022-05-20 | End: 2022-05-20

## 2022-05-20 RX ADMIN — APIXABAN 10 MG: 5 TABLET, FILM COATED ORAL at 17:41

## 2022-05-20 RX ADMIN — SODIUM CHLORIDE 1000 ML: 9 INJECTION, SOLUTION INTRAVENOUS at 13:43

## 2022-05-20 RX ADMIN — Medication 80 ML: at 14:14

## 2022-05-20 RX ADMIN — SODIUM CHLORIDE, PRESERVATIVE FREE 10 ML: 5 INJECTION INTRAVENOUS at 14:14

## 2022-05-20 RX ADMIN — IOPAMIDOL 100 ML: 755 INJECTION, SOLUTION INTRAVENOUS at 14:13

## 2022-05-20 ASSESSMENT — PAIN DESCRIPTION - PAIN TYPE: TYPE: ACUTE PAIN

## 2022-05-20 ASSESSMENT — PAIN DESCRIPTION - DESCRIPTORS: DESCRIPTORS: ACHING

## 2022-05-20 ASSESSMENT — PAIN DESCRIPTION - ORIENTATION: ORIENTATION: POSTERIOR

## 2022-05-20 ASSESSMENT — PAIN DESCRIPTION - LOCATION: LOCATION: HEAD

## 2022-05-20 ASSESSMENT — PAIN - FUNCTIONAL ASSESSMENT: PAIN_FUNCTIONAL_ASSESSMENT: 0-10

## 2022-05-20 ASSESSMENT — PAIN SCALES - GENERAL: PAINLEVEL_OUTOF10: 5

## 2022-05-20 NOTE — ED NOTES
AVS reviewed with the patient. Discussed follow-up care, taking medications as prescribed, and when to call 911. VSS. All questions answered. IV Removed per order. Prescriptions sent to patient's pharmacy. Ambulatory out of the department with a steady, unassisted gait.         Cirilo Schmidt RN  05/20/22 0427

## 2022-05-20 NOTE — ED PROVIDER NOTES
[id number] [facility] ED  [address] [address] [facility] [address]  Phone: [phone]  Fax: [phone]      Attending Physician [address]       Chief Complaint   Patient presents with    Chest Pain       DIAGNOSTIC RESULTS     LABS:  Labs Reviewed   CBC WITH AUTO DIFFERENTIAL - Abnormal; Notable for the following components:       Result Value    Hematocrit 46.8 (*)     All other components within normal limits   COMPREHENSIVE METABOLIC PANEL - Abnormal; Notable for the following components:    Glucose 106 (*)     Calcium 11.2 (*)     All other components within normal limits   LIPASE   TROPONIN   D-DIMER, QUANTITATIVE   URINALYSIS WITH REFLEX TO CULTURE       All other labs were within normal range or not returned as of this dictation. RADIOLOGY:  CT CHEST PULMONARY EMBOLISM W CONTRAST    (Results Pending)         EMERGENCY DEPARTMENT COURSE:   Vitals:    Vitals:    05/20/22 1216   BP: (!) 197/120   Pulse: 97   Resp: 18   TempSrc: Oral   SpO2: 99%   Weight: 120.2 kg (265 lb)   Height: 5' 10\" (1.778 m)     -------------------------  BP: (!) 197/120,  , Pulse: 97, Resp: 18             PERTINENT ATTENDING PHYSICIAN COMMENTS:    I performed a history and physical examination of the patient and discussed management with the mid level provider. I reviewed the mid level provider's note and agree with the documented findings and plan of care. Any areas of disagreement are noted on the chart. I was personally present for the key portions of any procedures. I have documented in the chart those procedures where I was not present during the key portions. I have reviewed the emergency nurses triage note. I agree with the chief complaint, past medical history, past surgical history, allergies, medications, social and family history as documented unless otherwise noted below.  Documentation of the HPI, Physical Exam and Medical Decision Making performed by mid level providers is based on my personal performance of the HPI, PE and MDM. For Physician Assistant/ Nurse Practitioner cases/documentation I have personally evaluated this patient and have completed at least one if not all key elements of the E/M (history, physical exam, and MDM). Additional findings are as noted. 16-year-old female here with left sided and retrosternal chest pain that radiates to her back. She also had back pain that then radiated to her chest. No personal history of heart disease, but there is family history. No family history of TAD. No history of connective tissue disease. Patient is quite hypertensive in the ED. Breath sounds are clear and equal. Cardiac exam with a normal rate and regular rhythm. Radial pulses are palpable and symmetrical. No calf tenderness or leg swelling.      EKG Interpretation    Interpreted by emergency department physician    Rhythm: normal sinus   Rate: normal  Axis: left  Ectopy: none  Conduction: normal  ST Segments: normal  T Waves: normal  Q Waves: none    Clinical Impression: non-specific EKG and left ventricular hypertrophy    Eliz Arshad MD          (Please note that portions of this note were completed with a voice recognition program.  Efforts were made to edit the dictations but occasionally words are mis-transcribed.)    Eliz Arshad MD  Attending Emergency Medicine Physician       Eliz Arshad MD  05/20/22 [facility]       Eliz Arshad MD  05/20/22 9133

## 2022-05-20 NOTE — PROGRESS NOTES
Called to evaluate the patient in the emergency room. This is a very pleasant 28-year-old female who presents to the hospital with chest pain. She has been found to have pulmonary embolism. There is no evidence of heart strain noted. With further history taking this appears to be a provoked DVT secondary to birth control pill use. She tells me that she did have a DVT in her calf years ago. This was not treated with anticoagulation and spontaneously resolved. The patient has been on birth control for nearly 30 years at this point. We had a long discussion regarding risk factors for DVT/PE. At this point in time I do recommend discontinuing birth control pills. The patient is not hypoxic and her chest pain has resolved. At this point in time with a long discussion regarding treatment options for her DVT/PE. Patient as well as  would prefer to be treated as an outpatient. I am going to place her on Eliquis. Recommendations are to follow-up with primary care physician on Monday to obtain lower extremity Dopplers. Although she does not have any significant swelling or erythema it may be reasonable to check lower extremities to rule out extensive DVT. That said on physical examination she does not have any evidence of extensive DVT. She does not have any calf tenderness, significant swelling, nor erythema. This point in time both patient as well as  are appreciative all the information regarding DVT/PE. Provided scripts for Eliquis and she can be discharged home with further work-up/follow-up as an outpatient.     Electronically signed by Yadi Posey DO on 5/20/2022 at 4:49 PM

## 2022-05-20 NOTE — ED TRIAGE NOTES
Patient arrives with c/o CP that began a few days ago. States today the pain began when sitting watching tv when she began to feel CP across the chest that lasted approx 5 minutes.

## 2022-05-20 NOTE — ED PROVIDER NOTES
[id number] [facility] ED  [address] [facility] [address]  Phone: [phone]  Fax: [address]      Pt Name: Onofre Herrera  MRN: [id number]  [place] 1965  Date of evaluation: 5/20/2022  Provider: Jesus Bravo PA-C    CHIEF COMPLAINT       Chief Complaint   Patient presents with    Chest Pain           HISTORY OF PRESENT ILLNESS  (Location/Symptom, Timing/Onset, Context/Setting, Quality, Duration, Modifying Factors, Severity.)   Onofre Herrera is a 49 y.o. female who presents to the emergency department complaining of chest symptoms. Context/Setting:   Patient here with  for evaluation of intermittent chest pain symptoms over the last 2 days. First episode was 2 days ago while she was seated, she states this is on the right side of the chest and a little bit of her upper back. This did resolve after about 5 minutes. This was not pleuritic in nature and was not febrile in nature. Patient states while seated today she had a similar 5-minute episode that was of the right/left anterior aspect of her chest which also resolved. This too is not pleuritic in nature. She denies any thoracic musculoskeletal history, she does have some neck pain chronically. She denies any recent overexertion. Upper neck pain/lower headache w/o vision changes or radicular pain. There is no worsening of symptoms with movement of the torso or upper extremities. Patient does report that her father had some coronary artery disease. She is having no new cough or shortness of breath. She denies any GI/ symptoms. No new swelling of the lower extremities or calf pain. Patient does have a history of DVT around 2020, this was spontaneous. She denies any other thrombotic history. Patient has a history of fibromyalgia and some other chronic pain areas.   She denies any use of estrogen/hormones or any associated recent travel or medical procedures. Timing/Onset:   As above  Quality:   Sharp   Pressure   Duration:   As above  Modifying Factors:   none  Severity:   Mild       Nursing Notes were reviewed. REVIEW OF SYSTEMS    (2-9 systems for level 4, 10 or more for level 5)     Constitutional: Denies recent fever, chills. Eyes: No visual changes. Neck: No neck pain. Respiratory: per HPI  Cardiac:  per HPI  GI:  Denies abdominal pain/nausea/vomiting/diarrhea. Musculoskeletal: Denies focal weakness. Neurologic: per HPI  Skin:  No rash. Except as noted above the remainder of the review of systems was reviewed and negative. PAST MEDICAL HISTORY   History reviewed. Diagnosis Date    Allergic     Arthritis     Oestoarthritis of the lower back, recieving steroid injections    ASCUS of cervix with negative high risk HPV 9/8/2016    Fibromyalgia 2021         SURGICAL HISTORY           Procedure Laterality Date    APPENDECTOMY      KNEE ARTHROSCOPY Left     US BREAST FINE NEEDLE ASPIRATION         CURRENT MEDICATIONS       Current Discharge Medication List      CONTINUE these medications which have NOT CHANGED    Details   Magnesium Oxide 500 MG TABS TAKE 1 TABLET BY MOUTH TWICE DAILY AS DIRECTED      tiZANidine (ZANAFLEX) 4 MG tablet TAKE 1 TABLET BY MOUTH THREE TIMES DAILY AS NEEDED      pregabalin (LYRICA) 75 MG capsule pregabalin 75 mg capsule   TAKE ONE CAPSULE BY MOUTH TWICE DAILY      simvastatin (ZOCOR) 40 MG tablet Take 40 mg by mouth nightly      albuterol sulfate HFA (PROAIR HFA) 108 (90 Base) MCG/ACT inhaler ProAir HFA 90 mcg/actuation aerosol inhaler      Cetirizine HCl 10 MG CAPS cetirizine 10 mg tablet   Take 1 tablet every day by oral route. ALLERGIES     Patient has no known allergies.     FAMILY HISTORY           Problem Relation Age of Onset    Heart Disease Father     Cancer Mother     Diabetes Mother     Breast Cancer Mother     Breast Cancer Maternal Grandmother      Family Status Relation Name Status    Father      Mother  Alive   Maryanna Runner  (Not Specified)        SOCIAL HISTORY      reports that she has never smoked. She has never used smokeless tobacco. She reports that she does not drink alcohol and does not use drugs. Lives with others. PHYSICAL EXAM    (up to 7 for level 4, 8 or more for level 5)     ED Triage Vitals [22 1216]   BP Temp Temp Source Pulse Resp SpO2 Height Weight   (!) 197/120 -- Oral 97 18 99 % 5' 10\" (1.778 m) 265 lb (120.2 kg)       Constitutional:  Well developed   Eyes:  Pupils equal/round  HENT:  Atraumatic, external ears normal, nose normal, oropharynx moist. Neck- supple   Respiratory:  Clear to auscultation bilaterally with good air exchange, no W/R/R. No anterior or posterior chestwall tenderness  Cardiovascular:  RRR with normal S1 and S2. Gastrointestinal/Abdomen:  Soft, NT. BS wnl. Musculoskeletal:  No edema, no tenderness, no deformities. Back:  No CVA tenderness. Normal to inspection. Integument:  No rash. Neurologic:  Alert, appropriate mentation/interaction, no focal deficits noted     DIAGNOSTIC RESULTS     EKG: All EKG's are interpreted by the Emergency Department Physician who either signs or Co-signs this chart in the absence of a cardiologist.    Not indicated, or per attending note      RADIOLOGY:   Non-plain film images such as CT, Ultrasound and MRI are read by the radiologist. Ferdie Sauce radiographic images are visualized and preliminarily interpreted by the emergency physician with the below findings:      Interpretation per the Radiologist below, if available at the time of this note:    CTA CHEST W WO CONTRAST   Final Result   Bilateral pulmonary emboli, the most proximal being segmental in the right   lower lobe with subsegmental emboli in the right upper and both lower lobes. Mild cardiomegaly without any prior imaging available for comparison.    Correlate clinically to exclude any evidence of associated right heart strain. No acute pulmonary findings. Multiloculated water attenuation likely hepatic cysts in the right lobe   measuring up to 3.2 cm. Colonic diverticulosis. Critical results were called by Dr. Zandra Rizzo to Dr. Rafa Callejas on   5/20/2022 at 2:58 PM EST. LABS:  Labs Reviewed   CBC WITH AUTO DIFFERENTIAL - Abnormal; Notable for the following components:       Result Value    Hematocrit 46.8 (*)     All other components within normal limits   COMPREHENSIVE METABOLIC PANEL - Abnormal; Notable for the following components:    Glucose 106 (*)     Calcium 11.2 (*)     All other components within normal limits   LIPASE   TROPONIN   D-DIMER, QUANTITATIVE   APTT   URINALYSIS WITH REFLEX TO CULTURE   APTT   APTT       All other labs were within normal range or not returned as of this dictation.     EMERGENCY DEPARTMENT COURSE and DIFFERENTIAL DIAGNOSIS/MDM:   Vitals:    Vitals:    05/20/22 1216 05/20/22 1319 05/20/22 1529   BP: (!) 197/120 (!) 165/100 (!) 162/101   Pulse: 97 76 87   Resp: 18     Temp: 98.4 °F (36.9 °C)     TempSrc: Oral     SpO2: 99% 97% 98%   Weight: 120.2 kg (265 lb)     Height: 5' 10\" (1.778 m)       Orders Placed This Encounter   Medications    0.9 % sodium chloride bolus    0.9 % sodium chloride bolus    iopamidol (ISOVUE-370) 76 % injection 100 mL    sodium chloride flush 0.9 % injection 10 mL    DISCONTD: heparin (porcine) injection 9,620 Units    DISCONTD: heparin (porcine) injection 9,620 Units    DISCONTD: heparin (porcine) injection 4,810 Units    DISCONTD: heparin 25,000 units in dextrose 5% 250 mL (premix) infusion    FOLLOWED BY Linked Order Group     apixaban (ELIQUIS) tablet 10 mg      Order Specific Question:   Indication of Use      Answer:   Treatment-DVT/PE     apixaban (ELIQUIS) tablet 5 mg      Order Specific Question:   Indication of Use      Answer:   Treatment-DVT/PE    apixaban (ELIQUIS) 5 MG TABS tablet     Sig: Take 2 tabs oral twice daily for 1 week then take 1 tab oral twice daily thereafter     Dispense:  74 tablet     Refill:  0    apixaban (ELIQUIS) 5 MG TABS tablet     Sig: Take 1 tablet by mouth 2 times daily     Dispense:  60 tablet     Refill:  5       0892  Patient here with complaints of episodic nonexertional chest pain, most recent was 2 days ago that was not pleuritic in nature nor febrile and then with a recurrence that was across both sides of the anterior aspect of her chest today. Lasted for approximately 5 minutes, with exception of some initial elevated blood pressure all vital signs are stable patient is well-appearing and comfortable. Lungs clear to auscultation, abdomen is benign. No new edema of the lower extremities or any tenderness. Completing cardiac work-up with dimer. Patient has no    1642  Hospitalist evaluated patient down in dept and appropriate for outpt f/u and oral anticoagulant. Eliquis 1st dose provided here. PCP f/u on Monday. Hospitalist discharged patient and educated to return to ED for worsening symptoms, new headache/neuro symptoms or respiratory distress. He provided all rxs upon discharge. This patient was seen by the attending physician and they agreed with the assessment and plan. I have reviewed the disposition diagnosis with the patient and or their family/guardian. I have answered their questions and given discharge instructions. They voiced understanding of these instructions and did not have any further questions or complaints. The patient has been instructed to follow up with their primary care physician and to return immediately to an ED if the symptoms change or worsen in any way. Patient verbalized understanding. CONSULTS:  None    PROCEDURES:  None    FINAL IMPRESSION      1.  Multiple subsegmental pulmonary emboli without acute cor pulmonale [facility])          DISPOSITION/PLAN   DISPOSITION Decision To Discharge 05/20/2022 04:39:49 PM      PATIENT REFERRED TO:  Judith Curtis  [address] [place] [address]  [phone]    Schedule an appointment as soon as possible for a visit in 3 days  for re-evaluation of your symptoms    [facility] ED  [address] [facility]. [address] [place] [address]  [phone]  Go to   for worsening of symptoms      DISCHARGE MEDICATIONS:  Current Discharge Medication List      START taking these medications    Details   !! apixaban (ELIQUIS) 5 MG TABS tablet Take 2 tabs oral twice daily for 1 week then take 1 tab oral twice daily thereafter  Qty: 74 tablet, Refills: 0      !! apixaban (ELIQUIS) 5 MG TABS tablet Take 1 tablet by mouth 2 times daily  Qty: 60 tablet, Refills: 5       !! - Potential duplicate medications found. Please discuss with provider.           (Please note that portions of this note were completed with a voice recognition program.  Efforts were made to edit the dictations but occasionally words are mis-transcribed.)    Maxene Carrel, PA-C Maxene Carrel, PA-C  05/20/22 3988

## 2022-05-22 LAB
EKG ATRIAL RATE: 90 BPM
EKG P AXIS: 36 DEGREES
EKG P-R INTERVAL: 128 MS
EKG Q-T INTERVAL: 344 MS
EKG QRS DURATION: 80 MS
EKG QTC CALCULATION (BAZETT): 420 MS
EKG R AXIS: -4 DEGREES
EKG T AXIS: 67 DEGREES
EKG VENTRICULAR RATE: 90 BPM

## 2023-05-26 RX ORDER — ATORVASTATIN CALCIUM 20 MG/1
TABLET, FILM COATED ORAL
COMMUNITY

## 2023-05-26 RX ORDER — LATANOPROST 50 UG/ML
1 SOLUTION/ DROPS OPHTHALMIC
COMMUNITY

## 2023-05-26 RX ORDER — ASPIRIN 81 MG/1
81 TABLET ORAL DAILY
COMMUNITY

## 2023-05-26 RX ORDER — ACETAMINOPHEN 160 MG
1 TABLET,DISINTEGRATING ORAL DAILY
COMMUNITY

## 2023-05-30 ENCOUNTER — ANESTHESIA EVENT (OUTPATIENT)
Dept: OPERATING ROOM | Age: 58
End: 2023-05-30
Payer: COMMERCIAL

## 2023-05-30 ENCOUNTER — HOSPITAL ENCOUNTER (OUTPATIENT)
Age: 58
Setting detail: OUTPATIENT SURGERY
Discharge: HOME | End: 2023-05-30
Attending: OPHTHALMOLOGY | Admitting: OPHTHALMOLOGY
Payer: COMMERCIAL

## 2023-05-30 ENCOUNTER — ANESTHESIA (OUTPATIENT)
Dept: OPERATING ROOM | Age: 58
End: 2023-05-30
Payer: COMMERCIAL

## 2023-05-30 VITALS
TEMPERATURE: 99.3 F | DIASTOLIC BLOOD PRESSURE: 57 MMHG | OXYGEN SATURATION: 94 % | RESPIRATION RATE: 21 BRPM | BODY MASS INDEX: 39.06 KG/M2 | HEIGHT: 71 IN | WEIGHT: 279 LBS | HEART RATE: 95 BPM | SYSTOLIC BLOOD PRESSURE: 133 MMHG

## 2023-05-30 PROBLEM — H33.001 MACULA-OFF RHEGMATOGENOUS RETINAL DETACHMENT, RIGHT: Chronic | Status: ACTIVE | Noted: 2023-05-30

## 2023-05-30 PROCEDURE — 2709999900 HC NON-CHARGEABLE SUPPLY: Performed by: OPHTHALMOLOGY

## 2023-05-30 PROCEDURE — 2500000003 HC RX 250 WO HCPCS: Performed by: OPHTHALMOLOGY

## 2023-05-30 PROCEDURE — 2580000003 HC RX 258: Performed by: ANESTHESIOLOGY

## 2023-05-30 PROCEDURE — 2580000003 HC RX 258: Performed by: OPHTHALMOLOGY

## 2023-05-30 PROCEDURE — 2500000003 HC RX 250 WO HCPCS

## 2023-05-30 PROCEDURE — 7100000001 HC PACU RECOVERY - ADDTL 15 MIN: Performed by: OPHTHALMOLOGY

## 2023-05-30 PROCEDURE — 7100000010 HC PHASE II RECOVERY - FIRST 15 MIN: Performed by: OPHTHALMOLOGY

## 2023-05-30 PROCEDURE — 6360000002 HC RX W HCPCS

## 2023-05-30 PROCEDURE — 3700000000 HC ANESTHESIA ATTENDED CARE: Performed by: OPHTHALMOLOGY

## 2023-05-30 PROCEDURE — 3700000001 HC ADD 15 MINUTES (ANESTHESIA): Performed by: OPHTHALMOLOGY

## 2023-05-30 PROCEDURE — A4217 STERILE WATER/SALINE, 500 ML: HCPCS | Performed by: OPHTHALMOLOGY

## 2023-05-30 PROCEDURE — 6360000002 HC RX W HCPCS: Performed by: OPHTHALMOLOGY

## 2023-05-30 PROCEDURE — 7100000000 HC PACU RECOVERY - FIRST 15 MIN: Performed by: OPHTHALMOLOGY

## 2023-05-30 PROCEDURE — 6370000000 HC RX 637 (ALT 250 FOR IP): Performed by: OPHTHALMOLOGY

## 2023-05-30 PROCEDURE — 7100000011 HC PHASE II RECOVERY - ADDTL 15 MIN: Performed by: OPHTHALMOLOGY

## 2023-05-30 PROCEDURE — 6370000000 HC RX 637 (ALT 250 FOR IP): Performed by: ANESTHESIOLOGY

## 2023-05-30 PROCEDURE — 81025 URINE PREGNANCY TEST: CPT

## 2023-05-30 PROCEDURE — C1784 OCULAR DEV, INTRAOP, DET RET: HCPCS | Performed by: OPHTHALMOLOGY

## 2023-05-30 PROCEDURE — 3600000004 HC SURGERY LEVEL 4 BASE: Performed by: OPHTHALMOLOGY

## 2023-05-30 PROCEDURE — 3600000014 HC SURGERY LEVEL 4 ADDTL 15MIN: Performed by: OPHTHALMOLOGY

## 2023-05-30 DEVICE — STRIP SCLER W3.5XL125MM THK0.75MM SIL SLD STYL 41/S2970: Type: IMPLANTABLE DEVICE | Site: EYE | Status: FUNCTIONAL

## 2023-05-30 RX ORDER — TROPICAMIDE 10 MG/ML
SOLUTION/ DROPS OPHTHALMIC PRN
Status: DISCONTINUED | OUTPATIENT
Start: 2023-05-30 | End: 2023-05-30 | Stop reason: HOSPADM

## 2023-05-30 RX ORDER — SODIUM CHLORIDE 9 MG/ML
INJECTION, SOLUTION INTRAVENOUS PRN
Status: DISCONTINUED | OUTPATIENT
Start: 2023-05-30 | End: 2023-05-30

## 2023-05-30 RX ORDER — CYCLOPENTOLATE HYDROCHLORIDE 10 MG/ML
1 SOLUTION/ DROPS OPHTHALMIC
Status: COMPLETED | OUTPATIENT
Start: 2023-05-30 | End: 2023-05-30

## 2023-05-30 RX ORDER — ACETAMINOPHEN 325 MG/1
650 TABLET ORAL ONCE
Status: COMPLETED | OUTPATIENT
Start: 2023-05-30 | End: 2023-05-30

## 2023-05-30 RX ORDER — CYCLOPENTOLATE HYDROCHLORIDE 10 MG/ML
SOLUTION/ DROPS OPHTHALMIC PRN
Status: DISCONTINUED | OUTPATIENT
Start: 2023-05-30 | End: 2023-05-30 | Stop reason: HOSPADM

## 2023-05-30 RX ORDER — SODIUM CHLORIDE 0.9 % (FLUSH) 0.9 %
5-40 SYRINGE (ML) INJECTION PRN
Status: DISCONTINUED | OUTPATIENT
Start: 2023-05-30 | End: 2023-05-30 | Stop reason: HOSPADM

## 2023-05-30 RX ORDER — MIDAZOLAM HYDROCHLORIDE 2 MG/2ML
1 INJECTION, SOLUTION INTRAMUSCULAR; INTRAVENOUS EVERY 10 MIN PRN
Status: DISCONTINUED | OUTPATIENT
Start: 2023-05-30 | End: 2023-05-30

## 2023-05-30 RX ORDER — SCOLOPAMINE TRANSDERMAL SYSTEM 1 MG/1
1 PATCH, EXTENDED RELEASE TRANSDERMAL
Status: DISCONTINUED | OUTPATIENT
Start: 2023-05-30 | End: 2023-05-30 | Stop reason: HOSPADM

## 2023-05-30 RX ORDER — ONDANSETRON 2 MG/ML
4 INJECTION INTRAMUSCULAR; INTRAVENOUS
Status: DISCONTINUED | OUTPATIENT
Start: 2023-05-30 | End: 2023-05-30 | Stop reason: HOSPADM

## 2023-05-30 RX ORDER — SODIUM CHLORIDE 9 MG/ML
INJECTION, SOLUTION INTRAVENOUS PRN
Status: DISCONTINUED | OUTPATIENT
Start: 2023-05-30 | End: 2023-05-30 | Stop reason: HOSPADM

## 2023-05-30 RX ORDER — SODIUM CHLORIDE 0.9 % (FLUSH) 0.9 %
5-40 SYRINGE (ML) INJECTION EVERY 12 HOURS SCHEDULED
Status: DISCONTINUED | OUTPATIENT
Start: 2023-05-30 | End: 2023-05-30 | Stop reason: HOSPADM

## 2023-05-30 RX ORDER — PHENYLEPHRINE HYDROCHLORIDE 100 MG/ML
1 SOLUTION/ DROPS OPHTHALMIC
Status: COMPLETED | OUTPATIENT
Start: 2023-05-30 | End: 2023-05-30

## 2023-05-30 RX ORDER — PHENYLEPHRINE HYDROCHLORIDE 10 MG/ML
INJECTION INTRAVENOUS PRN
Status: DISCONTINUED | OUTPATIENT
Start: 2023-05-30 | End: 2023-05-30 | Stop reason: SDUPTHER

## 2023-05-30 RX ORDER — LIDOCAINE HYDROCHLORIDE 10 MG/ML
1 INJECTION, SOLUTION INFILTRATION; PERINEURAL
Status: DISCONTINUED | OUTPATIENT
Start: 2023-05-30 | End: 2023-05-30

## 2023-05-30 RX ORDER — FENTANYL CITRATE 50 UG/ML
INJECTION, SOLUTION INTRAMUSCULAR; INTRAVENOUS PRN
Status: DISCONTINUED | OUTPATIENT
Start: 2023-05-30 | End: 2023-05-30 | Stop reason: SDUPTHER

## 2023-05-30 RX ORDER — ERYTHROMYCIN 5 MG/G
OINTMENT OPHTHALMIC PRN
Status: DISCONTINUED | OUTPATIENT
Start: 2023-05-30 | End: 2023-05-30 | Stop reason: HOSPADM

## 2023-05-30 RX ORDER — ONDANSETRON 2 MG/ML
INJECTION INTRAMUSCULAR; INTRAVENOUS PRN
Status: DISCONTINUED | OUTPATIENT
Start: 2023-05-30 | End: 2023-05-30 | Stop reason: SDUPTHER

## 2023-05-30 RX ORDER — MIDAZOLAM HYDROCHLORIDE 1 MG/ML
INJECTION INTRAMUSCULAR; INTRAVENOUS PRN
Status: DISCONTINUED | OUTPATIENT
Start: 2023-05-30 | End: 2023-05-30 | Stop reason: SDUPTHER

## 2023-05-30 RX ORDER — DEXAMETHASONE SODIUM PHOSPHATE 10 MG/ML
INJECTION INTRAMUSCULAR; INTRAVENOUS PRN
Status: DISCONTINUED | OUTPATIENT
Start: 2023-05-30 | End: 2023-05-30 | Stop reason: SDUPTHER

## 2023-05-30 RX ORDER — ROCURONIUM BROMIDE 10 MG/ML
INJECTION, SOLUTION INTRAVENOUS PRN
Status: DISCONTINUED | OUTPATIENT
Start: 2023-05-30 | End: 2023-05-30 | Stop reason: SDUPTHER

## 2023-05-30 RX ORDER — GLYCOPYRROLATE 0.2 MG/ML
INJECTION INTRAMUSCULAR; INTRAVENOUS PRN
Status: DISCONTINUED | OUTPATIENT
Start: 2023-05-30 | End: 2023-05-30 | Stop reason: SDUPTHER

## 2023-05-30 RX ORDER — TOBRAMYCIN AND DEXAMETHASONE 3; 1 MG/ML; MG/ML
1 SUSPENSION/ DROPS OPHTHALMIC ONCE
Status: COMPLETED | OUTPATIENT
Start: 2023-05-30 | End: 2023-05-30

## 2023-05-30 RX ORDER — LIDOCAINE HYDROCHLORIDE 10 MG/ML
INJECTION, SOLUTION EPIDURAL; INFILTRATION; INTRACAUDAL; PERINEURAL PRN
Status: DISCONTINUED | OUTPATIENT
Start: 2023-05-30 | End: 2023-05-30 | Stop reason: SDUPTHER

## 2023-05-30 RX ORDER — SODIUM CHLORIDE, SODIUM LACTATE, POTASSIUM CHLORIDE, CALCIUM CHLORIDE 600; 310; 30; 20 MG/100ML; MG/100ML; MG/100ML; MG/100ML
INJECTION, SOLUTION INTRAVENOUS CONTINUOUS
Status: DISCONTINUED | OUTPATIENT
Start: 2023-05-30 | End: 2023-05-30 | Stop reason: HOSPADM

## 2023-05-30 RX ORDER — PROPOFOL 10 MG/ML
INJECTION, EMULSION INTRAVENOUS PRN
Status: DISCONTINUED | OUTPATIENT
Start: 2023-05-30 | End: 2023-05-30 | Stop reason: SDUPTHER

## 2023-05-30 RX ORDER — BALANCED SALT SOLUTION ENRICHED WITH BICARBONATE, DEXTROSE, AND GLUTATHIONE
KIT INTRAOCULAR PRN
Status: DISCONTINUED | OUTPATIENT
Start: 2023-05-30 | End: 2023-05-30 | Stop reason: HOSPADM

## 2023-05-30 RX ADMIN — PHENYLEPHRINE HYDROCHLORIDE 1 DROP: 100 SOLUTION/ DROPS OPHTHALMIC at 12:55

## 2023-05-30 RX ADMIN — FENTANYL CITRATE 50 MCG: 50 INJECTION, SOLUTION INTRAMUSCULAR; INTRAVENOUS at 13:57

## 2023-05-30 RX ADMIN — SODIUM CHLORIDE, POTASSIUM CHLORIDE, SODIUM LACTATE AND CALCIUM CHLORIDE: 600; 310; 30; 20 INJECTION, SOLUTION INTRAVENOUS at 12:48

## 2023-05-30 RX ADMIN — MIDAZOLAM 1 MG: 1 INJECTION INTRAMUSCULAR; INTRAVENOUS at 13:00

## 2023-05-30 RX ADMIN — FENTANYL CITRATE 100 MCG: 50 INJECTION, SOLUTION INTRAMUSCULAR; INTRAVENOUS at 13:00

## 2023-05-30 RX ADMIN — PHENYLEPHRINE HYDROCHLORIDE 1 DROP: 100 SOLUTION/ DROPS OPHTHALMIC at 12:30

## 2023-05-30 RX ADMIN — CYCLOPENTOLATE HYDROCHLORIDE 1 DROP: 10 SOLUTION/ DROPS OPHTHALMIC at 12:35

## 2023-05-30 RX ADMIN — PROPOFOL 50 MG: 10 INJECTION, EMULSION INTRAVENOUS at 13:57

## 2023-05-30 RX ADMIN — PROPOFOL 150 MG: 10 INJECTION, EMULSION INTRAVENOUS at 13:00

## 2023-05-30 RX ADMIN — PHENYLEPHRINE HYDROCHLORIDE 100 MCG: 10 INJECTION INTRAVENOUS at 14:14

## 2023-05-30 RX ADMIN — SUGAMMADEX 200 MG: 100 INJECTION, SOLUTION INTRAVENOUS at 14:20

## 2023-05-30 RX ADMIN — GLYCOPYRROLATE 0.4 MG: 0.2 INJECTION INTRAMUSCULAR; INTRAVENOUS at 14:11

## 2023-05-30 RX ADMIN — TOBRAMYCIN AND DEXAMETHASONE 1 DROP: 3; 1 SUSPENSION/ DROPS OPHTHALMIC at 12:30

## 2023-05-30 RX ADMIN — LIDOCAINE HYDROCHLORIDE 50 MG: 10 INJECTION, SOLUTION EPIDURAL; INFILTRATION; INTRACAUDAL; PERINEURAL at 13:00

## 2023-05-30 RX ADMIN — PHENYLEPHRINE HYDROCHLORIDE 1 DROP: 100 SOLUTION/ DROPS OPHTHALMIC at 12:54

## 2023-05-30 RX ADMIN — CYCLOPENTOLATE HYDROCHLORIDE 1 DROP: 10 SOLUTION/ DROPS OPHTHALMIC at 12:40

## 2023-05-30 RX ADMIN — ACETAMINOPHEN 650 MG: 325 TABLET ORAL at 15:13

## 2023-05-30 RX ADMIN — DEXAMETHASONE SODIUM PHOSPHATE 10 MG: 10 INJECTION INTRAMUSCULAR; INTRAVENOUS at 13:35

## 2023-05-30 RX ADMIN — CYCLOPENTOLATE HYDROCHLORIDE 1 DROP: 10 SOLUTION/ DROPS OPHTHALMIC at 12:30

## 2023-05-30 RX ADMIN — FENTANYL CITRATE 50 MCG: 50 INJECTION, SOLUTION INTRAMUSCULAR; INTRAVENOUS at 13:32

## 2023-05-30 RX ADMIN — ONDANSETRON 4 MG: 2 INJECTION INTRAMUSCULAR; INTRAVENOUS at 14:16

## 2023-05-30 RX ADMIN — ROCURONIUM BROMIDE 40 MG: 10 INJECTION, SOLUTION INTRAVENOUS at 13:00

## 2023-05-30 ASSESSMENT — PAIN SCALES - GENERAL: PAINLEVEL_OUTOF10: 3

## 2023-05-30 ASSESSMENT — PAIN DESCRIPTION - LOCATION: LOCATION: EYE

## 2023-05-30 ASSESSMENT — PAIN - FUNCTIONAL ASSESSMENT: PAIN_FUNCTIONAL_ASSESSMENT: 0-10

## 2023-05-30 NOTE — ANESTHESIA POSTPROCEDURE EVALUATION
Department of Anesthesiology  Postprocedure Note    Patient: Dank Zhu  MRN: [id number]  YOB: 1965  Date of evaluation: 5/30/2023      Procedure Summary     Date: 05/30/23 Room / Location: [address]    Anesthesia Start: 1919 Anesthesia Stop: 2181    Procedure: VITRECTOMY 25 GAUGE, AIR FLUID EXCHANGE, AIR GAS EXCHANGE WITH 16% SF6, INDIRECT LASER, SCLERAL BUCKLE (Right) Diagnosis:       Retinal detachment, right      (RETINAL DETACHMENT RIGHT EYE)    Surgeons: Tricia Will MD Responsible Provider: Shannen Joshi MD    Anesthesia Type: general ASA Status: 2          Anesthesia Type: No value filed.     Pasha Phase I: Pasha Score: 8    Pasha Phase II:        Anesthesia Post Evaluation    Patient location during evaluation: bedside  Patient participation: complete - patient participated  Level of consciousness: awake  Airway patency: patent  Nausea & Vomiting: no nausea and no vomiting  Complications: no  Cardiovascular status: hemodynamically stable  Respiratory status: acceptable  Hydration status: stable  Comments: /66   Pulse 99   Temp 99.3 °F (37.4 °C) (Temporal)   Resp 16   Ht 5' 11\" (1.803 m)   Wt 279 lb (126.6 kg)   SpO2 92%   BMI 38.91 kg/m²

## 2023-05-30 NOTE — ANESTHESIA PRE PROCEDURE
were no vitals filed for this visit. BP Readings from Last 3 Encounters:   05/20/22 (!) 148/90   09/14/21 (!) 144/92   04/15/21 (!) 134/90       NPO Status: Time of last liquid consumption: 2200                        Time of last solid consumption: 2100                                                      BMI:   Wt Readings from Last 3 Encounters:   05/20/22 265 lb (120.2 kg)   09/14/21 250 lb (113.4 kg)   04/15/21 264 lb (119.7 kg)     There is no height or weight on file to calculate BMI.    CBC:   Lab Results   Component Value Date/Time    WBC 9.2 05/20/2022 12:25 PM    RBC 5.06 05/20/2022 12:25 PM    HGB 15.5 05/20/2022 12:25 PM    HCT 46.8 05/20/2022 12:25 PM    MCV 92.5 05/20/2022 12:25 PM    RDW 14.6 05/20/2022 12:25 PM     05/20/2022 12:25 PM       CMP:   Lab Results   Component Value Date/Time     05/20/2022 12:25 PM    K 3.7 05/20/2022 12:25 PM     05/20/2022 12:25 PM    CO2 25 05/20/2022 12:25 PM    BUN 20 05/20/2022 12:25 PM    CREATININE 0.62 05/20/2022 12:25 PM    GFRAA >60 05/20/2022 12:25 PM    LABGLOM >60 05/20/2022 12:25 PM    GLUCOSE 106 05/20/2022 12:25 PM    PROT 8.0 05/20/2022 12:25 PM    CALCIUM 11.2 05/20/2022 12:25 PM    BILITOT 0.40 05/20/2022 12:25 PM    ALKPHOS 101 05/20/2022 12:25 PM    AST 27 05/20/2022 12:25 PM    ALT 28 05/20/2022 12:25 PM       POC Tests: No results for input(s): POCGLU, POCNA, POCK, POCCL, POCBUN, POCHEMO, POCHCT in the last 72 hours.     Coags:   Lab Results   Component Value Date/Time    APTT 23.4 05/20/2022 12:25 PM       HCG (If Applicable): No results found for: PREGTESTUR, PREGSERUM, HCG, HCGQUANT     ABGs: No results found for: PHART, PO2ART, MVK7NEN, KHS4NXT, BEART, F0JFCXUD     Type & Screen (If Applicable):  No results found for: LABABO, LABRH    Drug/Infectious Status (If Applicable):  No results found for: HIV, HEPCAB    COVID-19 Screening (If Applicable): No results found for:

## 2023-05-30 NOTE — H&P
History and Physical    Pt Name: Donaldo Carranza  MRN: [id number]  YOB: 1965  Date of evaluation: 5/30/2023  Primary Care Physician: Anthony Caicedo    SUBJECTIVE:   History of Chief Complaint:    Donaldo Carranza is a 56 y.o. female who is scheduled today for SCLERAL BUCKLE - Right. She reports noticing vision changes on the right 3.5 weeks ago, describes as a shimmering. She reports history of \"kaleidoscope headaches\" and that the shimmering kept worsening, \"getting bigger and bigger\". She reports initially seeing optometrist, the neurologist and eventually retinal specialist.   Allergies  is allergic to amoxicillin, bee venom, grass extracts [gramineae pollens], pcn [penicillins], and pollen extract. Medications  Prior to Admission medications    Medication Sig Start Date End Date Taking? Authorizing Provider   Cholecalciferol (VITAMIN D3) 50 MCG (2000 UT) CAPS Take 1 capsule by mouth daily   Yes Historical Provider, MD   atorvastatin (LIPITOR) 20 MG tablet atorvastatin 20 mg tablet    Historical Provider, MD   aspirin 81 MG EC tablet Take 1 tablet by mouth daily    Historical Provider, MD   cyanocobalamin 1000 MCG tablet cyanocobalamin (vit B-12) 1,000 mcg tablet    Historical Provider, MD   latanoprost (XALATAN) 0.005 % ophthalmic solution Apply 1 drop to eye    Historical Provider, MD   pregabalin (LYRICA) 75 MG capsule pregabalin 75 mg capsule   TAKE ONE CAPSULE BY MOUTH TWICE DAILY    Historical Provider, MD   albuterol sulfate HFA (PROVENTIL;VENTOLIN;PROAIR) 108 (90 Base) MCG/ACT inhaler ProAir HFA 90 mcg/actuation aerosol inhaler    Historical Provider, MD   Cetirizine HCl 10 MG CAPS cetirizine 10 mg tablet   Take 1 tablet every day by oral route.     Historical Provider, MD     Past Medical History    has a past medical history of Abnormal uterine bleeding (AUB), Anxiety and depression, Arthritis, COVID-19 vaccine series completed, DDD (degenerative disc disease), lumbar,

## 2023-05-30 NOTE — BRIEF OP NOTE
Brief Postoperative Note      Patient: Dina Alcantar  YOB: 1965  MRN: [id number]    Date of Procedure: 5/30/2023    Pre-Op Diagnosis Codes:     * Retinal detachment, right [H33.21]    Post-Op Diagnosis: Same       Procedure:  Scleral Buckle, Limited Vitrectomy,16% SF6 injection, Cryopexy    Surgeon(s):  Niranjan Rodriges MD    Assistant:  * No surgical staff found *    Anesthesia: General    Estimated Blood Loss (mL): Minimal    Complications: None    Specimens:   * No specimens in log *    Implants:  Implant Name Type Inv.  Item Serial No.  Lot No. LRB No. Used Action   STRIP SCLER [id number] THK0.75MM ANGELA SLD STYL 41/ - [id number]  STRIP SCLER [id number] Double R [place] ANGELA SLD STYL 41/  [facility]- [id number] Right 1 Implanted             Electronically signed by Niranjan Rodriges MD on 5/30/2023 at 2:38 PM

## 2023-05-31 LAB — HCG, PREGNANCY URINE (POC): NEGATIVE

## 2023-05-31 NOTE — OP NOTE
side of the localization point. A #506 sponge  was cut and placed beneath the sutures. The sutures were then pulled up  and tied and the sponge trimmed. Indirect ophthalmoscopy showed the  sponge was in good position. There was considerable subretinal fluid  remaining since we did not drain. I elected to put in one trocars  temporally and removed approximately 1 mL of central vitreous. I then  placed 1 mL of 16% SF6 in the eye. The sub-Tenon's area was irrigated  with gentamicin and later Marcaine. The conjunctiva was repaired with  6-0 plain gut suture. The eye was patched in the usual fashion. The  patient was awakened, taken to recovery room in good condition. Her   was instructed to maintain mostly face-down or left side down  positioning for the rest of the day. Chantelle Ramirez    D: 05/30/2023 14:47:34       T: 05/30/2023 14:55:30     [email or web address]  Job#: [id number]     Doc#: [id number]    CC:

## 2023-11-16 ENCOUNTER — HOSPITAL ENCOUNTER (OUTPATIENT)
Dept: MRI IMAGING | Facility: CLINIC | Age: 58
Discharge: HOME | End: 2023-11-18
Payer: COMMERCIAL

## 2023-11-16 DIAGNOSIS — M54.50 LOW BACK PAIN, UNSPECIFIED BACK PAIN LATERALITY, UNSPECIFIED CHRONICITY, UNSPECIFIED WHETHER SCIATICA PRESENT: ICD-10-CM

## 2023-11-16 DIAGNOSIS — M54.16 LUMBAR RADICULOPATHY: ICD-10-CM

## 2023-11-16 PROCEDURE — 72148 MRI LUMBAR SPINE W/O DYE: CPT

## 2023-12-07 ENCOUNTER — HOSPITAL ENCOUNTER (OUTPATIENT)
Dept: CT IMAGING | Facility: CLINIC | Age: 58
Discharge: HOME | End: 2023-12-09
Payer: COMMERCIAL

## 2023-12-07 DIAGNOSIS — R31.0 GROSS HEMATURIA: ICD-10-CM

## 2023-12-07 PROCEDURE — 74176 CT ABD & PELVIS W/O CONTRAST: CPT

## 2024-02-07 ENCOUNTER — OFFICE VISIT (OUTPATIENT)
Age: 59
End: 2024-02-07
Payer: COMMERCIAL

## 2024-02-07 VITALS
HEART RATE: 81 BPM | BODY MASS INDEX: 38.74 KG/M2 | HEIGHT: 71 IN | SYSTOLIC BLOOD PRESSURE: 143 MMHG | DIASTOLIC BLOOD PRESSURE: 86 MMHG

## 2024-02-07 DIAGNOSIS — M54.50 LUMBAR PAIN: Primary | ICD-10-CM

## 2024-02-07 DIAGNOSIS — M51.36 LUMBAR DEGENERATIVE DISC DISEASE: ICD-10-CM

## 2024-02-07 PROCEDURE — 99204 OFFICE O/P NEW MOD 45 MIN: CPT | Performed by: NEUROLOGICAL SURGERY

## 2024-02-07 ASSESSMENT — ENCOUNTER SYMPTOMS
WHEEZING: 0
ABDOMINAL PAIN: 0
COUGH: 0
NAUSEA: 0
BACK PAIN: 1
CONSTIPATION: 0
VOMITING: 0
SHORTNESS OF BREATH: 0

## 2024-02-07 NOTE — PROGRESS NOTES
Review of Systems   Constitutional:  Positive for fatigue. Negative for chills, fever and unexpected weight change.   Respiratory:  Negative for cough, shortness of breath and wheezing.    Cardiovascular:  Negative for chest pain and palpitations.   Gastrointestinal:  Negative for abdominal pain, constipation, nausea and vomiting.   Musculoskeletal:  Positive for back pain, gait problem, myalgias, neck pain and neck stiffness. Negative for joint swelling.   Neurological:  Positive for weakness, numbness and headaches. Negative for dizziness.     
  Vaping Use    Vaping Use: Never used   Substance Use Topics    Alcohol use: No    Drug use: No       Allergies   Allergen Reactions    Amoxicillin     Bee Venom     Grass Extracts [Gramineae Pollens]     Pcn [Penicillins]     Pollen Extract        Review of Systems:     I reviewed the review of systems as documented by clinical staff.      Physical Exam:      BP (!) 143/86 (Site: Left Upper Arm, Position: Sitting)   Pulse 81   Ht 1.803 m (5' 11\")   BMI 38.74 kg/m²     General examination: The patient is awake, alert, in no apparent distress.    Back: Patient has minimal tenderness on palpation over the bony lumbar spine and adjacent paraspinal musculature. Sacroiliac joints are nontender bilaterally.    Musculoskeletal: Hips are nontender bilaterally.  Domenic's testing is negative. Straight leg raise is negative to 90 degrees bilaterally.    Extremities: Hands are warm to touch, radial pulses are 2+. There is no evidence of edema, cyanosis, or clubbing.    Neurologic: The patient is awake, alert, they follow commands briskly, with clear speech. Comprehension appears normal. There are no focal cranial neurologic deficits to my examination. The patient's pupils are 3 mm, equal, round and reactive to light. The patient has full extraocular eye movements, conjugate gaze is full. Facial sensation is intact, facial expression is symmetric. Tongue is midline. Hearing is intact bilaterally. Shoulder shrug is 5 out of 5 equal bilaterally. The patient has normal strength throughout bilateral upper and lower extremities. The patient has normal sensation in both upper and lower extremities. Reflexes are +1 at the patella and 1+ at the biceps bilaterally.  No clonus or hyperreflexia on examination. Gait is steady.      Studies Review:     A recent MRI of the lumbar spine performed November 17, 2023 is reviewed.  This study is fairly unremarkable for the patient stated age of 57.  She does have a degree of early

## 2024-11-15 ENCOUNTER — TRANSCRIBE ORDERS (OUTPATIENT)
Dept: ADMINISTRATIVE | Age: 59
End: 2024-11-15

## 2024-11-15 DIAGNOSIS — E03.9 ACQUIRED HYPOTHYROIDISM: Primary | ICD-10-CM

## 2024-11-20 ENCOUNTER — HOSPITAL ENCOUNTER (OUTPATIENT)
Dept: ULTRASOUND IMAGING | Age: 59
Discharge: HOME | End: 2024-11-22
Payer: COMMERCIAL

## 2024-11-20 DIAGNOSIS — E03.9 ACQUIRED HYPOTHYROIDISM: ICD-10-CM

## 2024-11-20 PROCEDURE — 76536 US EXAM OF HEAD AND NECK: CPT

## (undated) DEVICE — NEEDLE HYPO 30GA L0.5IN BGE POLYPR HUB S STL REG BVL STR

## (undated) DEVICE — SUTURE PERMAHAND SZ 4-0 L30IN NONABSORBABLE BLK L17MM RB-1 K871H

## (undated) DEVICE — STANDARD HYPODERMIC NEEDLE,ALUMINUM HUB: Brand: MONOJECT

## (undated) DEVICE — SYRINGE MED 1ML POLYCARB LUERLOCK HUB

## (undated) DEVICE — SUTURE VCRL SZ 7-0 L18IN ABSRB VLT L6.5MM TG140-8 3/8 CIR J546G

## (undated) DEVICE — BLUNTFILL WITH FILTER: Brand: MONOJECT

## (undated) DEVICE — OCCUCOAT SYRINGE 1ML 6PK: Brand: OCCUCOAT

## (undated) DEVICE — SYRINGE MED 3ML CLR PLAS STD N CTRL LUERLOCK TIP DISP

## (undated) DEVICE — BLADE OPHTH MULTISIDED SHRP ALL ARND FOR GLAUCOMA RETIN

## (undated) DEVICE — COVER LT HNDL BLU PLAS

## (undated) DEVICE — SYRINGE, LUER LOCK, 10ML: Brand: MEDLINE

## (undated) DEVICE — SUTURE PLN GUT SZ 6-0 L18IN ABSRB YELLOWISH TAN L6.5MM 1735G

## (undated) DEVICE — MARKER SKIN GENTIAN VLT FN TIP W/ 6IN RUL L RESVR MED GRD

## (undated) DEVICE — SYRINGE ST FILTERS W/MCE MEMBRAN

## (undated) DEVICE — SUTURE ETHLN SZ 5-0 L18IN NONABSORBABLE BLK S-14 L8MM 1/4 7731G